# Patient Record
Sex: MALE | Race: WHITE | NOT HISPANIC OR LATINO | Employment: OTHER | URBAN - METROPOLITAN AREA
[De-identification: names, ages, dates, MRNs, and addresses within clinical notes are randomized per-mention and may not be internally consistent; named-entity substitution may affect disease eponyms.]

---

## 2023-04-03 ENCOUNTER — OFFICE VISIT (OUTPATIENT)
Dept: WOUND CARE | Facility: HOSPITAL | Age: 38
End: 2023-04-03
Payer: MEDICARE

## 2023-04-03 ENCOUNTER — LAB REQUISITION (OUTPATIENT)
Dept: LAB | Facility: HOSPITAL | Age: 38
End: 2023-04-03
Payer: MEDICARE

## 2023-04-03 DIAGNOSIS — T81.31XA DISRUPTION OF EXTERNAL OPERATION (SURGICAL) WOUND, NOT ELSEWHERE CLASSIFIED, INITIAL ENCOUNTER: ICD-10-CM

## 2023-04-03 PROCEDURE — 87186 SC STD MICRODIL/AGAR DIL: CPT | Performed by: NURSE PRACTITIONER

## 2023-04-03 PROCEDURE — 87147 CULTURE TYPE IMMUNOLOGIC: CPT | Performed by: NURSE PRACTITIONER

## 2023-04-03 PROCEDURE — 97602 WOUND(S) CARE NON-SELECTIVE: CPT

## 2023-04-03 PROCEDURE — G0463 HOSPITAL OUTPT CLINIC VISIT: HCPCS

## 2023-04-03 PROCEDURE — 87070 CULTURE OTHR SPECIMN AEROBIC: CPT | Performed by: NURSE PRACTITIONER

## 2023-04-03 PROCEDURE — 87205 SMEAR GRAM STAIN: CPT | Performed by: NURSE PRACTITIONER

## 2023-04-03 PROCEDURE — 87015 SPECIMEN INFECT AGNT CONCNTJ: CPT | Performed by: NURSE PRACTITIONER

## 2023-04-03 PROCEDURE — 87075 CULTR BACTERIA EXCEPT BLOOD: CPT | Performed by: NURSE PRACTITIONER

## 2023-04-07 LAB
BACTERIA SPEC AEROBE CULT: ABNORMAL
BACTERIA SPEC AEROBE CULT: ABNORMAL
GRAM STN SPEC: ABNORMAL

## 2023-04-08 LAB — BACTERIA SPEC ANAEROBE CULT: NORMAL

## 2024-11-27 PROBLEM — M54.2 PAIN RADIATING TO NECK: Chronic | Status: ACTIVE | Noted: 2020-04-27

## 2024-11-27 PROBLEM — G47.30 SLEEP APNEA: Chronic | Status: ACTIVE | Noted: 2020-03-11

## 2024-11-27 PROBLEM — Z79.2 LONG TERM CURRENT USE OF ANTIBIOTICS: Status: ACTIVE | Noted: 2023-03-01

## 2024-11-27 PROBLEM — R33.9 RETENTION OF URINE: Status: ACTIVE | Noted: 2017-12-14

## 2024-11-27 PROBLEM — N52.9 ERECTILE DYSFUNCTION: Chronic | Status: ACTIVE | Noted: 2017-12-14

## 2024-11-27 PROBLEM — M54.2 NECK PAIN: Status: RESOLVED | Noted: 2023-04-18 | Resolved: 2024-11-27

## 2024-11-27 PROBLEM — N32.89 SPASM OF BLADDER: Status: ACTIVE | Noted: 2017-03-17

## 2024-11-27 PROBLEM — M79.2 NEUROPATHIC PAIN: Status: ACTIVE | Noted: 2017-03-17

## 2024-11-27 PROBLEM — F90.9 ATTENTION DEFICIT HYPERACTIVITY DISORDER: Status: RESOLVED | Noted: 2021-09-23 | Resolved: 2024-11-27

## 2024-11-27 PROBLEM — S14.109A: Status: ACTIVE | Noted: 2017-03-17

## 2024-11-27 PROBLEM — R25.2 SPASTICITY: Status: ACTIVE | Noted: 2019-03-11

## 2024-11-27 PROBLEM — N52.9 ERECTILE DYSFUNCTION: Status: RESOLVED | Noted: 2017-12-14 | Resolved: 2024-11-27

## 2024-11-27 PROBLEM — Z79.2 LONG TERM CURRENT USE OF ANTIBIOTICS: Chronic | Status: ACTIVE | Noted: 2023-03-01

## 2024-11-27 PROBLEM — M46.02 SPINAL ENTHESOPATHY, CERVICAL REGION: Chronic | Status: ACTIVE | Noted: 2017-03-17

## 2024-11-27 PROBLEM — G89.4 CHRONIC PAIN DISORDER: Status: RESOLVED | Noted: 2023-04-18 | Resolved: 2024-11-27

## 2024-11-27 PROBLEM — G82.50 TETRAPLEGIA: Chronic | Status: ACTIVE | Noted: 2017-03-17

## 2024-11-27 PROBLEM — M54.2 NECK PAIN: Status: ACTIVE | Noted: 2023-04-18

## 2024-11-27 PROBLEM — M54.2 PAIN RADIATING TO NECK: Status: ACTIVE | Noted: 2020-04-27

## 2024-11-27 PROBLEM — N39.0 RECURRENT URINARY TRACT INFECTION: Chronic | Status: ACTIVE | Noted: 2021-09-23

## 2024-11-27 PROBLEM — R61 HYPERHIDROSIS: Status: ACTIVE | Noted: 2023-09-07

## 2024-11-27 PROBLEM — F90.9 ATTENTION DEFICIT HYPERACTIVITY DISORDER: Status: ACTIVE | Noted: 2021-09-23

## 2024-11-27 PROBLEM — G47.30 SLEEP APNEA: Status: RESOLVED | Noted: 2020-03-11 | Resolved: 2024-11-27

## 2024-11-27 PROBLEM — Z79.2 LONG TERM CURRENT USE OF ANTIBIOTICS: Status: RESOLVED | Noted: 2023-03-01 | Resolved: 2024-11-27

## 2024-11-27 PROBLEM — F32.A DEPRESSIVE DISORDER: Status: RESOLVED | Noted: 2021-09-23 | Resolved: 2024-11-27

## 2024-11-27 PROBLEM — M46.02 SPINAL ENTHESOPATHY, CERVICAL REGION: Status: RESOLVED | Noted: 2017-03-17 | Resolved: 2024-11-27

## 2024-11-27 PROBLEM — M79.2 NEUROPATHIC PAIN: Status: RESOLVED | Noted: 2017-03-17 | Resolved: 2024-11-27

## 2024-11-27 PROBLEM — R33.9 RETENTION OF URINE: Status: RESOLVED | Noted: 2017-12-14 | Resolved: 2024-11-27

## 2024-11-27 PROBLEM — N32.89 SPASM OF BLADDER: Status: RESOLVED | Noted: 2017-03-17 | Resolved: 2024-11-27

## 2024-11-27 PROBLEM — N39.0 RECURRENT URINARY TRACT INFECTION: Status: RESOLVED | Noted: 2021-09-23 | Resolved: 2024-11-27

## 2024-11-27 PROBLEM — N39.9 DISORDER OF URINARY TRACT: Status: ACTIVE | Noted: 2024-11-19

## 2024-11-27 PROBLEM — R61 HYPERHIDROSIS: Status: RESOLVED | Noted: 2023-09-07 | Resolved: 2024-11-27

## 2024-11-27 PROBLEM — N39.9 DISORDER OF URINARY TRACT: Status: RESOLVED | Noted: 2024-11-19 | Resolved: 2024-11-27

## 2024-11-27 PROBLEM — G95.0 SYRINGOMYELIA: Status: RESOLVED | Noted: 2018-10-23 | Resolved: 2024-11-27

## 2024-11-27 PROBLEM — M54.2 PAIN RADIATING TO NECK: Status: RESOLVED | Noted: 2020-04-27 | Resolved: 2024-11-27

## 2024-11-27 PROBLEM — G90.3 NEUROGENIC ORTHOSTATIC HYPOTENSION: Status: RESOLVED | Noted: 2020-12-07 | Resolved: 2024-11-27

## 2024-11-27 PROBLEM — G82.50 TETRAPLEGIA: Status: ACTIVE | Noted: 2017-03-17

## 2024-11-27 PROBLEM — G90.3 NEUROGENIC ORTHOSTATIC HYPOTENSION: Status: ACTIVE | Noted: 2020-12-07

## 2024-11-27 PROBLEM — M79.2 NEUROPATHIC PAIN: Chronic | Status: ACTIVE | Noted: 2017-03-17

## 2024-11-27 PROBLEM — G47.30 SLEEP APNEA: Status: ACTIVE | Noted: 2020-03-11

## 2024-11-27 PROBLEM — G82.50 TETRAPLEGIA: Status: RESOLVED | Noted: 2017-03-17 | Resolved: 2024-11-27

## 2024-11-27 PROBLEM — M54.2 NECK PAIN: Chronic | Status: ACTIVE | Noted: 2023-04-18

## 2024-11-27 PROBLEM — G95.0 SYRINGOMYELIA: Status: ACTIVE | Noted: 2018-10-23

## 2024-11-27 PROBLEM — G90.3 NEUROGENIC ORTHOSTATIC HYPOTENSION: Chronic | Status: ACTIVE | Noted: 2020-12-07

## 2024-11-27 PROBLEM — N39.0 RECURRENT URINARY TRACT INFECTION: Status: ACTIVE | Noted: 2021-09-23

## 2024-11-27 PROBLEM — N52.9 ERECTILE DYSFUNCTION: Status: ACTIVE | Noted: 2017-12-14

## 2024-11-27 PROBLEM — R25.2 SPASTICITY: Status: RESOLVED | Noted: 2019-03-11 | Resolved: 2024-11-27

## 2024-11-27 PROBLEM — M46.02 SPINAL ENTHESOPATHY, CERVICAL REGION: Status: ACTIVE | Noted: 2017-03-17

## 2024-11-27 PROBLEM — F32.A DEPRESSIVE DISORDER: Status: ACTIVE | Noted: 2021-09-23

## 2024-11-27 PROBLEM — R33.9 RETENTION OF URINE: Chronic | Status: ACTIVE | Noted: 2017-12-14

## 2024-11-27 PROBLEM — G89.4 CHRONIC PAIN DISORDER: Status: ACTIVE | Noted: 2023-04-18

## 2024-11-28 ENCOUNTER — HOSPITAL ENCOUNTER (EMERGENCY)
Facility: HOSPITAL | Age: 39
Discharge: HOME | End: 2024-11-28
Attending: EMERGENCY MEDICINE | Admitting: EMERGENCY MEDICINE
Payer: MEDICARE

## 2024-11-28 ENCOUNTER — APPOINTMENT (OUTPATIENT)
Dept: CT IMAGING | Facility: HOSPITAL | Age: 39
End: 2024-11-28
Payer: MEDICARE

## 2024-11-28 VITALS
TEMPERATURE: 98.6 F | WEIGHT: 196 LBS | HEIGHT: 71 IN | HEART RATE: 68 BPM | OXYGEN SATURATION: 99 % | SYSTOLIC BLOOD PRESSURE: 119 MMHG | DIASTOLIC BLOOD PRESSURE: 73 MMHG | RESPIRATION RATE: 19 BRPM | BODY MASS INDEX: 27.44 KG/M2

## 2024-11-28 DIAGNOSIS — R51.9 NONINTRACTABLE HEADACHE, UNSPECIFIED CHRONICITY PATTERN, UNSPECIFIED HEADACHE TYPE: Primary | ICD-10-CM

## 2024-11-28 DIAGNOSIS — G90.4 AUTONOMIC DYSREFLEXIA: ICD-10-CM

## 2024-11-28 LAB
ANION GAP SERPL CALCULATED.3IONS-SCNC: 13.7 MMOL/L (ref 5–15)
BASOPHILS # BLD AUTO: 0.01 10*3/MM3 (ref 0–0.2)
BASOPHILS NFR BLD AUTO: 0.3 % (ref 0–1.5)
BILIRUB UR QL STRIP: NEGATIVE
BUN SERPL-MCNC: 14 MG/DL (ref 6–20)
BUN/CREAT SERPL: 17.5 (ref 7–25)
CALCIUM SPEC-SCNC: 10.1 MG/DL (ref 8.6–10.5)
CHLORIDE SERPL-SCNC: 106 MMOL/L (ref 98–107)
CLARITY UR: CLEAR
CO2 SERPL-SCNC: 24.3 MMOL/L (ref 22–29)
COLOR UR: YELLOW
CREAT SERPL-MCNC: 0.8 MG/DL (ref 0.76–1.27)
DEPRECATED RDW RBC AUTO: 42.4 FL (ref 37–54)
EGFRCR SERPLBLD CKD-EPI 2021: 116.9 ML/MIN/1.73
EOSINOPHIL # BLD AUTO: 0 10*3/MM3 (ref 0–0.4)
EOSINOPHIL NFR BLD AUTO: 0 % (ref 0.3–6.2)
ERYTHROCYTE [DISTWIDTH] IN BLOOD BY AUTOMATED COUNT: 13 % (ref 12.3–15.4)
GLUCOSE SERPL-MCNC: 145 MG/DL (ref 65–99)
GLUCOSE UR STRIP-MCNC: NEGATIVE MG/DL
HCT VFR BLD AUTO: 45.4 % (ref 37.5–51)
HGB BLD-MCNC: 15.5 G/DL (ref 13–17.7)
HGB UR QL STRIP.AUTO: NEGATIVE
IMM GRANULOCYTES # BLD AUTO: 0.01 10*3/MM3 (ref 0–0.05)
IMM GRANULOCYTES NFR BLD AUTO: 0.3 % (ref 0–0.5)
KETONES UR QL STRIP: NEGATIVE
LEUKOCYTE ESTERASE UR QL STRIP.AUTO: NEGATIVE
LYMPHOCYTES # BLD AUTO: 0.33 10*3/MM3 (ref 0.7–3.1)
LYMPHOCYTES NFR BLD AUTO: 10.2 % (ref 19.6–45.3)
MCH RBC QN AUTO: 30.9 PG (ref 26.6–33)
MCHC RBC AUTO-ENTMCNC: 34.1 G/DL (ref 31.5–35.7)
MCV RBC AUTO: 90.6 FL (ref 79–97)
MONOCYTES # BLD AUTO: 0.41 10*3/MM3 (ref 0.1–0.9)
MONOCYTES NFR BLD AUTO: 12.7 % (ref 5–12)
NEUTROPHILS NFR BLD AUTO: 2.48 10*3/MM3 (ref 1.7–7)
NEUTROPHILS NFR BLD AUTO: 76.5 % (ref 42.7–76)
NITRITE UR QL STRIP: NEGATIVE
NRBC BLD AUTO-RTO: 0 /100 WBC (ref 0–0.2)
PH UR STRIP.AUTO: 7 [PH] (ref 5–8)
PLAT MORPH BLD: NORMAL
PLATELET # BLD AUTO: 123 10*3/MM3 (ref 140–450)
PMV BLD AUTO: 10.9 FL (ref 6–12)
POTASSIUM SERPL-SCNC: 3.9 MMOL/L (ref 3.5–5.2)
PROT UR QL STRIP: NEGATIVE
RBC # BLD AUTO: 5.01 10*6/MM3 (ref 4.14–5.8)
RBC MORPH BLD: NORMAL
SODIUM SERPL-SCNC: 144 MMOL/L (ref 136–145)
SP GR UR STRIP: <=1.005 (ref 1–1.03)
UROBILINOGEN UR QL STRIP: NORMAL
WBC MORPH BLD: NORMAL
WBC NRBC COR # BLD AUTO: 3.24 10*3/MM3 (ref 3.4–10.8)
WHOLE BLOOD HOLD COAG: NORMAL

## 2024-11-28 PROCEDURE — 25010000002 ONDANSETRON PER 1 MG: Performed by: EMERGENCY MEDICINE

## 2024-11-28 PROCEDURE — 80048 BASIC METABOLIC PNL TOTAL CA: CPT | Performed by: EMERGENCY MEDICINE

## 2024-11-28 PROCEDURE — 25010000002 DIPHENHYDRAMINE PER 50 MG: Performed by: EMERGENCY MEDICINE

## 2024-11-28 PROCEDURE — 81003 URINALYSIS AUTO W/O SCOPE: CPT | Performed by: EMERGENCY MEDICINE

## 2024-11-28 PROCEDURE — 36415 COLL VENOUS BLD VENIPUNCTURE: CPT

## 2024-11-28 PROCEDURE — 25010000002 HYDROMORPHONE 1 MG/ML SOLUTION: Performed by: EMERGENCY MEDICINE

## 2024-11-28 PROCEDURE — 70450 CT HEAD/BRAIN W/O DYE: CPT

## 2024-11-28 PROCEDURE — 96375 TX/PRO/DX INJ NEW DRUG ADDON: CPT

## 2024-11-28 PROCEDURE — 85007 BL SMEAR W/DIFF WBC COUNT: CPT | Performed by: EMERGENCY MEDICINE

## 2024-11-28 PROCEDURE — 25010000002 LABETALOL 5 MG/ML SOLUTION: Performed by: EMERGENCY MEDICINE

## 2024-11-28 PROCEDURE — 85025 COMPLETE CBC W/AUTO DIFF WBC: CPT | Performed by: EMERGENCY MEDICINE

## 2024-11-28 PROCEDURE — 93005 ELECTROCARDIOGRAM TRACING: CPT | Performed by: EMERGENCY MEDICINE

## 2024-11-28 PROCEDURE — 96374 THER/PROPH/DIAG INJ IV PUSH: CPT

## 2024-11-28 PROCEDURE — 99284 EMERGENCY DEPT VISIT MOD MDM: CPT

## 2024-11-28 RX ORDER — NITROGLYCERIN 0.4 MG/1
0.4 TABLET SUBLINGUAL ONCE
Status: DISCONTINUED | OUTPATIENT
Start: 2024-11-28 | End: 2024-11-28

## 2024-11-28 RX ORDER — NITROGLYCERIN 0.4 MG/1
0.4 TABLET SUBLINGUAL
Status: DISCONTINUED | OUTPATIENT
Start: 2024-11-28 | End: 2024-11-28 | Stop reason: HOSPADM

## 2024-11-28 RX ORDER — SODIUM CHLORIDE 0.9 % (FLUSH) 0.9 %
10 SYRINGE (ML) INJECTION AS NEEDED
Status: DISCONTINUED | OUTPATIENT
Start: 2024-11-28 | End: 2024-11-28 | Stop reason: HOSPADM

## 2024-11-28 RX ORDER — NITROGLYCERIN 0.4 MG/1
0.4 TABLET SUBLINGUAL
Qty: 10 TABLET | Refills: 0 | Status: SHIPPED | OUTPATIENT
Start: 2024-11-28

## 2024-11-28 RX ORDER — ONDANSETRON 2 MG/ML
4 INJECTION INTRAMUSCULAR; INTRAVENOUS ONCE
Status: COMPLETED | OUTPATIENT
Start: 2024-11-28 | End: 2024-11-28

## 2024-11-28 RX ORDER — LABETALOL HYDROCHLORIDE 5 MG/ML
20 INJECTION, SOLUTION INTRAVENOUS ONCE
Status: COMPLETED | OUTPATIENT
Start: 2024-11-28 | End: 2024-11-28

## 2024-11-28 RX ORDER — DIPHENHYDRAMINE HYDROCHLORIDE 50 MG/ML
25 INJECTION INTRAMUSCULAR; INTRAVENOUS ONCE
Status: COMPLETED | OUTPATIENT
Start: 2024-11-28 | End: 2024-11-28

## 2024-11-28 RX ADMIN — HYDROMORPHONE HYDROCHLORIDE 1 MG: 1 INJECTION, SOLUTION INTRAMUSCULAR; INTRAVENOUS; SUBCUTANEOUS at 15:27

## 2024-11-28 RX ADMIN — LABETALOL HYDROCHLORIDE 10 MG: 5 INJECTION, SOLUTION INTRAVENOUS at 16:21

## 2024-11-28 RX ADMIN — ONDANSETRON 4 MG: 2 INJECTION, SOLUTION INTRAMUSCULAR; INTRAVENOUS at 15:26

## 2024-11-28 RX ADMIN — NITROGLYCERIN 0.4 MG: 0.4 TABLET SUBLINGUAL at 18:05

## 2024-11-28 RX ADMIN — DIPHENHYDRAMINE HYDROCHLORIDE 25 MG: 50 INJECTION, SOLUTION INTRAMUSCULAR; INTRAVENOUS at 15:26
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
## 2024-11-28 NOTE — ED NOTES
Upon RN preparing pt for discharge, pt was put in supine position slowly to get pants on and immediately developed severe headache with BP of 165/79. Dr. Langston immediately notified and ordered sublingual nitro.

## 2024-11-28 NOTE — DISCHARGE INSTRUCTIONS
Do not take nitroglycerin if you have taken erectile dysfunction medication within the last 72 hours.  Nitroglycerin as needed for elevated blood pressure and headache.  Return for increased pain, fever, or any other concerns.

## 2024-11-29 LAB
QT INTERVAL: 335 MS
QTC INTERVAL: 417 MS